# Patient Record
Sex: MALE | Race: WHITE | Employment: PART TIME | URBAN - NONMETROPOLITAN AREA
[De-identification: names, ages, dates, MRNs, and addresses within clinical notes are randomized per-mention and may not be internally consistent; named-entity substitution may affect disease eponyms.]

---
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
## 2025-02-28 ENCOUNTER — OFFICE VISIT (OUTPATIENT)
Dept: PRIMARY CARE CLINIC | Age: 20
End: 2025-02-28

## 2025-02-28 VITALS
OXYGEN SATURATION: 98 % | RESPIRATION RATE: 16 BRPM | SYSTOLIC BLOOD PRESSURE: 99 MMHG | TEMPERATURE: 98.5 F | HEART RATE: 71 BPM | DIASTOLIC BLOOD PRESSURE: 54 MMHG | BODY MASS INDEX: 17.22 KG/M2 | WEIGHT: 123 LBS | HEIGHT: 71 IN

## 2025-02-28 DIAGNOSIS — R42 DIZZINESS: ICD-10-CM

## 2025-02-28 DIAGNOSIS — I95.9 HYPOTENSION, UNSPECIFIED HYPOTENSION TYPE: Primary | ICD-10-CM

## 2025-02-28 PROCEDURE — 99203 OFFICE O/P NEW LOW 30 MIN: CPT

## 2025-02-28 SDOH — ECONOMIC STABILITY: FOOD INSECURITY: WITHIN THE PAST 12 MONTHS, THE FOOD YOU BOUGHT JUST DIDN'T LAST AND YOU DIDN'T HAVE MONEY TO GET MORE.: NEVER TRUE

## 2025-02-28 SDOH — ECONOMIC STABILITY: FOOD INSECURITY: WITHIN THE PAST 12 MONTHS, YOU WORRIED THAT YOUR FOOD WOULD RUN OUT BEFORE YOU GOT MONEY TO BUY MORE.: NEVER TRUE

## 2025-02-28 ASSESSMENT — ENCOUNTER SYMPTOMS
NAUSEA: 0
SHORTNESS OF BREATH: 0
VOMITING: 0

## 2025-02-28 ASSESSMENT — PATIENT HEALTH QUESTIONNAIRE - PHQ9
SUM OF ALL RESPONSES TO PHQ QUESTIONS 1-9: 0
2. FEELING DOWN, DEPRESSED OR HOPELESS: NOT AT ALL
SUM OF ALL RESPONSES TO PHQ9 QUESTIONS 1 & 2: 0
1. LITTLE INTEREST OR PLEASURE IN DOING THINGS: NOT AT ALL
SUM OF ALL RESPONSES TO PHQ QUESTIONS 1-9: 0

## 2025-02-28 NOTE — PROGRESS NOTES
Orthostatic BP, Right arm laying down, BP- 106/63, P- 67  Sitting 101/58 Right arm P-68  Standing Left arm 96/55 P-69

## 2025-02-28 NOTE — PROGRESS NOTES
[facility] department of [facility]  [address] [facility] [address]  Phone: [phone]  Fax: [phone]      Zachary Edmonds  2005  MRN: [id number]  Date of visit: 2/28/2025    Chief Complaint:     Zachary Edmonds is here for c/o of Dizziness (Vertigo, leg weakness starting last night. Not much of  an appetite. Wants iron level checked)      HPI:     Zachary Edmonds is a 19 y.o. male who presents to the [facility] today for his medical conditions/complaints as noted below.    History of Present Illness  The patient is a 19-year-old male who presents with dizziness, vertigo, leg weakness that started last night, not much of an appetite and he wants his iron levels checked.    He experienced an episode of hemoptysis while brushing his teeth around noon yesterday, followed by a sudden onset of knee weakness at midnight. His family observed pallor, and he reported blurred vision, tinnitus, and difficulty focusing his eyes. The episode lasted approximately 20 minutes. He did not experience nausea or vomiting but reported a decreased appetite. Today, he reports persistent weakness, a sensation of warmth, and impending dizziness. He has not had a fever but occasionally experiences chills. He also reports intermittent chest pain, lasting about 5 minutes, which resolves spontaneously and recurs after 2 to 3 days. This symptom has been present for several years. He does not have a primary care physician. He does not feel fatigued or have upper respiratory symptoms. He did not lose consciousness during the episode but felt near syncope and had to lie down. He reports mild weakness and slight blurring of vision. He consumes a lot of fluids, including water, Gatorade, energy drinks, soda, and coffee. His diet consists mainly of fast food and microwavable meals due to his busy work schedule. He is not aware of any abnormal bruising or bleeding. He typically

## 2025-02-28 NOTE — PATIENT INSTRUCTIONS
Increase sodium and electrolyte  Increase water intake  Decrease caffeine and sugary drinks  Work on well-rounded diet  Increase red meats, greens and may take an iron supplement  If symptoms worsen follow up with PCP  Patient verbalized understanding and agrees with plan of care